# Patient Record
Sex: FEMALE | Race: WHITE | NOT HISPANIC OR LATINO
[De-identification: names, ages, dates, MRNs, and addresses within clinical notes are randomized per-mention and may not be internally consistent; named-entity substitution may affect disease eponyms.]

---

## 2017-09-06 ENCOUNTER — FORM ENCOUNTER (OUTPATIENT)
Age: 57
End: 2017-09-06

## 2018-09-05 ENCOUNTER — FORM ENCOUNTER (OUTPATIENT)
Age: 58
End: 2018-09-05

## 2019-09-04 ENCOUNTER — FORM ENCOUNTER (OUTPATIENT)
Age: 59
End: 2019-09-04

## 2019-09-18 ENCOUNTER — FORM ENCOUNTER (OUTPATIENT)
Age: 59
End: 2019-09-18

## 2020-09-08 ENCOUNTER — FORM ENCOUNTER (OUTPATIENT)
Age: 60
End: 2020-09-08

## 2020-09-13 ENCOUNTER — FORM ENCOUNTER (OUTPATIENT)
Age: 60
End: 2020-09-13

## 2020-09-21 ENCOUNTER — FORM ENCOUNTER (OUTPATIENT)
Age: 60
End: 2020-09-21

## 2020-09-22 ENCOUNTER — FORM ENCOUNTER (OUTPATIENT)
Age: 60
End: 2020-09-22

## 2020-09-23 ENCOUNTER — FORM ENCOUNTER (OUTPATIENT)
Age: 60
End: 2020-09-23

## 2020-10-21 ENCOUNTER — FORM ENCOUNTER (OUTPATIENT)
Age: 60
End: 2020-10-21

## 2020-11-10 ENCOUNTER — FORM ENCOUNTER (OUTPATIENT)
Age: 60
End: 2020-11-10

## 2020-11-30 ENCOUNTER — FORM ENCOUNTER (OUTPATIENT)
Age: 60
End: 2020-11-30

## 2021-03-02 ENCOUNTER — NON-APPOINTMENT (OUTPATIENT)
Age: 61
End: 2021-03-02

## 2021-03-02 PROBLEM — Z00.00 ENCOUNTER FOR PREVENTIVE HEALTH EXAMINATION: Status: ACTIVE | Noted: 2021-03-02

## 2021-03-05 PROBLEM — Z86.59 HISTORY OF DEPRESSION: Status: RESOLVED | Noted: 2021-03-05 | Resolved: 2021-03-05

## 2021-03-05 PROBLEM — Z86.79 HISTORY OF HYPERTENSION: Status: RESOLVED | Noted: 2021-03-05 | Resolved: 2021-03-05

## 2021-03-11 ENCOUNTER — APPOINTMENT (OUTPATIENT)
Dept: BREAST CENTER | Facility: CLINIC | Age: 61
End: 2021-03-11
Payer: COMMERCIAL

## 2021-03-11 VITALS
HEART RATE: 79 BPM | HEIGHT: 59 IN | WEIGHT: 134 LBS | DIASTOLIC BLOOD PRESSURE: 83 MMHG | SYSTOLIC BLOOD PRESSURE: 129 MMHG | BODY MASS INDEX: 27.01 KG/M2

## 2021-03-11 DIAGNOSIS — Z86.59 PERSONAL HISTORY OF OTHER MENTAL AND BEHAVIORAL DISORDERS: ICD-10-CM

## 2021-03-11 DIAGNOSIS — Z86.79 PERSONAL HISTORY OF OTHER DISEASES OF THE CIRCULATORY SYSTEM: ICD-10-CM

## 2021-03-11 PROCEDURE — 99072 ADDL SUPL MATRL&STAF TM PHE: CPT

## 2021-03-11 PROCEDURE — 99214 OFFICE O/P EST MOD 30 MIN: CPT

## 2021-04-16 ENCOUNTER — APPOINTMENT (OUTPATIENT)
Dept: BREAST CENTER | Facility: CLINIC | Age: 61
End: 2021-04-16

## 2021-09-29 ENCOUNTER — NON-APPOINTMENT (OUTPATIENT)
Age: 61
End: 2021-09-29

## 2021-09-29 ENCOUNTER — APPOINTMENT (OUTPATIENT)
Dept: BREAST CENTER | Facility: CLINIC | Age: 61
End: 2021-09-29
Payer: COMMERCIAL

## 2021-09-29 VITALS
DIASTOLIC BLOOD PRESSURE: 79 MMHG | SYSTOLIC BLOOD PRESSURE: 138 MMHG | BODY MASS INDEX: 25.4 KG/M2 | HEIGHT: 59 IN | HEART RATE: 80 BPM | WEIGHT: 126 LBS

## 2021-09-29 PROCEDURE — 99214 OFFICE O/P EST MOD 30 MIN: CPT

## 2021-09-29 RX ORDER — ASPIRIN 81 MG
81 TABLET, DELAYED RELEASE (ENTERIC COATED) ORAL
Refills: 0 | Status: DISCONTINUED | COMMUNITY
End: 2021-09-29

## 2021-10-13 ENCOUNTER — TRANSCRIPTION ENCOUNTER (OUTPATIENT)
Age: 61
End: 2021-10-13

## 2021-10-27 ENCOUNTER — APPOINTMENT (OUTPATIENT)
Dept: OTOLARYNGOLOGY | Facility: CLINIC | Age: 61
End: 2021-10-27
Payer: COMMERCIAL

## 2021-10-27 VITALS
DIASTOLIC BLOOD PRESSURE: 83 MMHG | SYSTOLIC BLOOD PRESSURE: 123 MMHG | TEMPERATURE: 97.2 F | OXYGEN SATURATION: 98 % | HEIGHT: 59 IN | WEIGHT: 127 LBS | HEART RATE: 100 BPM | BODY MASS INDEX: 25.6 KG/M2

## 2021-10-27 DIAGNOSIS — R42 DIZZINESS AND GIDDINESS: ICD-10-CM

## 2021-10-27 PROCEDURE — 99203 OFFICE O/P NEW LOW 30 MIN: CPT

## 2021-10-27 NOTE — ASSESSMENT
[FreeTextEntry1] : -BPPV.\par -will schedule repositioning maneuvers with vest. tx for tomorrow morning.\par -Antivert and Compazine prescribed.

## 2021-10-27 NOTE — HISTORY OF PRESENT ILLNESS
[de-identified] : Initial visit.\par Reports that 2 days she awoke with intense vertigo.  Improved somewhat.\par Yesterday and today much worse.  Associated with extreme nausea and vomiting.\par She notices when she moves her head.\par No history of chronic ear problems.\par She reports having  a similar problem years ago, that responded to head exercises.\par

## 2021-12-15 ENCOUNTER — OUTPATIENT (OUTPATIENT)
Dept: OUTPATIENT SERVICES | Facility: HOSPITAL | Age: 61
LOS: 1 days | Discharge: HOME | End: 2021-12-15
Payer: COMMERCIAL

## 2021-12-15 PROCEDURE — 90870 ELECTROCONVULSIVE THERAPY: CPT

## 2021-12-17 ENCOUNTER — OUTPATIENT (OUTPATIENT)
Dept: OUTPATIENT SERVICES | Facility: HOSPITAL | Age: 61
LOS: 1 days | Discharge: HOME | End: 2021-12-17
Payer: COMMERCIAL

## 2021-12-17 PROCEDURE — 90870 ELECTROCONVULSIVE THERAPY: CPT

## 2021-12-20 ENCOUNTER — OUTPATIENT (OUTPATIENT)
Dept: OUTPATIENT SERVICES | Facility: HOSPITAL | Age: 61
LOS: 1 days | Discharge: HOME | End: 2021-12-20
Payer: COMMERCIAL

## 2021-12-20 PROCEDURE — 90870 ELECTROCONVULSIVE THERAPY: CPT

## 2021-12-22 ENCOUNTER — OUTPATIENT (OUTPATIENT)
Dept: OUTPATIENT SERVICES | Facility: HOSPITAL | Age: 61
LOS: 1 days | Discharge: HOME | End: 2021-12-22
Payer: COMMERCIAL

## 2021-12-22 PROCEDURE — 90870 ELECTROCONVULSIVE THERAPY: CPT

## 2021-12-29 ENCOUNTER — OUTPATIENT (OUTPATIENT)
Dept: OUTPATIENT SERVICES | Facility: HOSPITAL | Age: 61
LOS: 1 days | Discharge: HOME | End: 2021-12-29
Payer: COMMERCIAL

## 2021-12-29 PROCEDURE — 90870 ELECTROCONVULSIVE THERAPY: CPT

## 2022-01-03 ENCOUNTER — OUTPATIENT (OUTPATIENT)
Dept: OUTPATIENT SERVICES | Facility: HOSPITAL | Age: 62
LOS: 1 days | Discharge: HOME | End: 2022-01-03
Payer: COMMERCIAL

## 2022-01-03 PROCEDURE — 90870 ELECTROCONVULSIVE THERAPY: CPT

## 2022-02-10 ENCOUNTER — APPOINTMENT (OUTPATIENT)
Dept: HEART AND VASCULAR | Facility: CLINIC | Age: 62
End: 2022-02-10
Payer: COMMERCIAL

## 2022-02-10 ENCOUNTER — NON-APPOINTMENT (OUTPATIENT)
Age: 62
End: 2022-02-10

## 2022-02-10 VITALS
HEIGHT: 59 IN | BODY MASS INDEX: 22.78 KG/M2 | HEART RATE: 82 BPM | DIASTOLIC BLOOD PRESSURE: 62 MMHG | WEIGHT: 113 LBS | SYSTOLIC BLOOD PRESSURE: 135 MMHG

## 2022-02-10 PROCEDURE — 99204 OFFICE O/P NEW MOD 45 MIN: CPT

## 2022-02-10 PROCEDURE — 93000 ELECTROCARDIOGRAM COMPLETE: CPT

## 2022-02-10 RX ORDER — METOPROLOL SUCCINATE 50 MG/1
50 TABLET, EXTENDED RELEASE ORAL
Refills: 0 | Status: ACTIVE | COMMUNITY
Start: 2022-02-10

## 2022-02-10 RX ORDER — LOSARTAN POTASSIUM 100 MG/1
TABLET, FILM COATED ORAL
Refills: 0 | Status: DISCONTINUED | COMMUNITY
End: 2022-02-10

## 2022-02-10 RX ORDER — MECLIZINE HYDROCHLORIDE 12.5 MG/1
12.5 TABLET ORAL
Qty: 20 | Refills: 0 | Status: DISCONTINUED | COMMUNITY
Start: 2021-10-27 | End: 2022-02-10

## 2022-02-10 RX ORDER — VILAZODONE HYDROCHLORIDE 40 MG/1
TABLET ORAL
Refills: 0 | Status: DISCONTINUED | COMMUNITY
End: 2022-02-10

## 2022-02-10 RX ORDER — PROCHLORPERAZINE MALEATE 10 MG/1
10 TABLET ORAL
Qty: 12 | Refills: 0 | Status: DISCONTINUED | COMMUNITY
Start: 2021-10-27 | End: 2022-02-10

## 2022-02-10 RX ORDER — ARIPIPRAZOLE 2 MG/1
TABLET ORAL
Refills: 0 | Status: DISCONTINUED | COMMUNITY
End: 2022-02-10

## 2022-02-10 NOTE — PHYSICAL EXAM
[Well Developed] : well developed [Well Nourished] : well nourished [No Acute Distress] : no acute distress [Normal Conjunctiva] : normal conjunctiva [Normal S1, S2] : normal S1, S2 [Clear Lung Fields] : clear lung fields [Good Air Entry] : good air entry [No Respiratory Distress] : no respiratory distress  [Soft] : abdomen soft [Normal Gait] : normal gait [No Edema] : no edema [No Rash] : no rash [Moves all extremities] : moves all extremities [Alert and Oriented] : alert and oriented

## 2022-02-13 NOTE — REVIEW OF SYSTEMS
[Headache] : headache [Feeling Fatigued] : feeling fatigued [Palpitations] : palpitations [Dizziness] : dizziness [Depression] : depression [Anxiety] : anxiety [Negative] : Heme/Lymph [Fever] : no fever [Chills] : no chills [SOB] : no shortness of breath [Dyspnea on exertion] : not dyspnea during exertion [Chest Discomfort] : no chest discomfort [Syncope] : no syncope

## 2022-02-13 NOTE — ADDENDUM
[FreeTextEntry1] : I, Tej Herndon, hereby attest that the medical record entry for this patient accurately reflects signatures/notations that I made on the Date of Service in my capacity as an Attending Physician when I treated/diagnosed the above patient. I do hereby attest that this information is true, accurate and complete to the best of my knowledge and I understand that any falsification, omission, or concealment of material fact may subject me to administrative, civil, or, criminal liability. I agree with the note as written by my PA in its entirety.\par I was present for the entire visit and supervised the entire visit and agree with the plan as outlined.\par \par

## 2022-02-13 NOTE — REASON FOR VISIT
[FreeTextEntry1] : 62 year old female with anxiety and depression s/p ECT depression ECT, HTN and palpitations, who presents for initial evaluation.\par \par SHe states she had ECT after being very depressed since COVID.  Since then she has been experiencing nausea, fatigue, headache and vertigo like symptoms.  She also notes fast heart rates and palpitations.  She states they are regular and rapid and can last hours.  They are associated with increased anxiety.  No prior history of arrhythmia.  She saw Dr. Powell from cardiology and had an echo which she states was normal besides slight MR.  He switched her Losartan to Toprol.  The Metoprolol has helped with the palpitations.  No chest pain, syncope, orthopnea, PND or BOOTHE.  \par \par

## 2022-02-13 NOTE — DISCUSSION/SUMMARY
[FreeTextEntry1] : 62 year old female with anxiety and depression s/p ECT depression ECT, HTN and palpitations, who presents for initial evaluation.  We discussed that palpitations can be from the normal heart rhythm or be from an arrhythmia.  I told her I would like to make sure she isnt having any atrial fibrillation and she is agreeable to wear a 2 week event monitor to assess for this (palpitations occur regularly).  She has her other symptoms today while in NSR so we discussed they are likely not associated with a heart rhythm issues.  We discussed that Metoprolol can increase depression in a subset of patients.  As of yet she has been tolerating this well with some symptom relief.  She will follow up after she returns her monitor or sooner if needed and knows to call with any questions or concerns.

## 2022-03-01 ENCOUNTER — APPOINTMENT (OUTPATIENT)
Dept: HEART AND VASCULAR | Facility: CLINIC | Age: 62
End: 2022-03-01
Payer: COMMERCIAL

## 2022-03-01 PROCEDURE — 93272 ECG/REVIEW INTERPRET ONLY: CPT

## 2022-03-07 ENCOUNTER — APPOINTMENT (OUTPATIENT)
Dept: BREAST CENTER | Facility: CLINIC | Age: 62
End: 2022-03-07

## 2022-04-25 ENCOUNTER — APPOINTMENT (OUTPATIENT)
Dept: BREAST CENTER | Facility: CLINIC | Age: 62
End: 2022-04-25
Payer: COMMERCIAL

## 2022-04-25 VITALS — BODY MASS INDEX: 23.59 KG/M2 | HEIGHT: 59 IN | WEIGHT: 117 LBS

## 2022-04-25 DIAGNOSIS — Z78.9 OTHER SPECIFIED HEALTH STATUS: ICD-10-CM

## 2022-04-25 PROCEDURE — 99213 OFFICE O/P EST LOW 20 MIN: CPT

## 2022-04-25 RX ORDER — VENLAFAXINE HYDROCHLORIDE 225 MG/1
225 TABLET, EXTENDED RELEASE ORAL
Refills: 0 | Status: ACTIVE | COMMUNITY

## 2022-04-25 RX ORDER — VILAZODONE HYDROCHLORIDE 20 MG/1
20 TABLET ORAL
Refills: 0 | Status: DISCONTINUED | COMMUNITY
Start: 2022-02-10 | End: 2022-04-25

## 2022-04-28 ENCOUNTER — APPOINTMENT (OUTPATIENT)
Dept: HEART AND VASCULAR | Facility: CLINIC | Age: 62
End: 2022-04-28

## 2022-09-09 ENCOUNTER — APPOINTMENT (OUTPATIENT)
Dept: BREAST CENTER | Facility: CLINIC | Age: 62
End: 2022-09-09

## 2022-09-09 VITALS
HEART RATE: 73 BPM | BODY MASS INDEX: 26.21 KG/M2 | SYSTOLIC BLOOD PRESSURE: 147 MMHG | HEIGHT: 59 IN | DIASTOLIC BLOOD PRESSURE: 85 MMHG | WEIGHT: 130 LBS

## 2022-09-09 DIAGNOSIS — N64.9 DISORDER OF BREAST, UNSPECIFIED: ICD-10-CM

## 2022-09-09 PROCEDURE — 99213 OFFICE O/P EST LOW 20 MIN: CPT

## 2022-09-09 RX ORDER — ATORVASTATIN CALCIUM 10 MG/1
10 TABLET, FILM COATED ORAL
Refills: 0 | Status: ACTIVE | COMMUNITY

## 2023-03-06 ENCOUNTER — APPOINTMENT (OUTPATIENT)
Dept: BREAST CENTER | Facility: CLINIC | Age: 63
End: 2023-03-06
Payer: COMMERCIAL

## 2023-03-06 VITALS
HEIGHT: 59 IN | DIASTOLIC BLOOD PRESSURE: 82 MMHG | WEIGHT: 146 LBS | HEART RATE: 80 BPM | SYSTOLIC BLOOD PRESSURE: 125 MMHG | BODY MASS INDEX: 29.43 KG/M2

## 2023-03-06 DIAGNOSIS — Z78.9 OTHER SPECIFIED HEALTH STATUS: ICD-10-CM

## 2023-03-06 PROCEDURE — 99213 OFFICE O/P EST LOW 20 MIN: CPT

## 2023-03-06 NOTE — HISTORY OF PRESENT ILLNESS
[FreeTextEntry1] : Patient is a 64yo F who presents for breast cancer surveillance. S/p LEFT needle loc. lumpx and SNB in 10/2020 (age 60) yielding 1.7cm IDC, ER+, HER2-. S/p RT and currently on Anastrozole (Dr. Mcnamara). S/p LEFT excisional bx in 2006 (age 46) for ADH (Dr. Donovan, no path on file). Patient has a family history of breast cancer in mother, dx age 44. Patient is BRCA/9 gene panel negative (tested 2020). Patient denies any palpable masses, skin changes, or nipple discharge. \par \par TNM Staging: pT1c, pN0, pM0\par AJCC Staging: IA\par \par 1/10/12: R FNA: negative for malignant cells\par 1/10/12: L FNA: atypical -sheets of apocrine cells w/ focal atypia; favoring cystic papillary apocrine metaplasia over low-grade apocrine neoplastic changes\par 9/7/17: B/l MG: dense, L - stable superior post-op distortion. BI-RADS 2.\par 9/5/19: B/l MG: stable. BI-RADS 2.\par 9/9/20: B/l MG & US - dense L - questioned lower central persistent architectural distortion; US - L - 1.8cm irregular hypoechoic mass 5:00 5FN c/w distortion on MG; R - 0.4cm cyst 9:00 7FN. BI-RADS 4.\par 9/14/20: L Us bx 5:00 5FN: ribbon shaped clip - 0.8cm well-differentiated IDC ER+ (90%), [place]+ (90%), HER2 1+ (-)\par 9/22/20: MRI -left 1 .6 area enhancement corresponds to known cancer. Additional 3.5 CM linear enhancement-biopsy recommend\par 9/24/20: L MRI bx of anterior NME path- nonprolif breast changes, cylinder clip, concordant. L MRI bx of posterior NME path-  nonprolif breast changes, dumbbell clip, concordant \par 10/22/20: L nloc lumpx & SLNBx path- 1.7 cm well differentiated invasive ductal carcinoma (ER/[place] >90%, HER2-), intermediate DCIS, negative margins, LN 0/1\par 9/8/21: B/L MG & US- Dense. L postsurgical changes. No adenopathy. BIRADS 2.\par 4/25/22: MRI- no MR evidence of malignancy\par 9/9/22: B/l MG & US- heterogenously dense. L stable postsurgical changes RAJEEV. BI-RADS 2\par 3/6/23: B/l MRI- heterogeneously dense, L stable postsurgical and RT changes. RAJEEV. BIRADS 2.

## 2023-03-06 NOTE — PAST MEDICAL HISTORY
[Menarche Age ____] : age at menarche was [unfilled] [Menopause Age____] : age at menopause was [unfilled] [Total Preg ___] : G[unfilled] [Live Births ___] : P[unfilled]  [Age At Live Birth ___] : Age at live birth: [unfilled] [History of Hormone Replacement Treatment] : has no history of hormone replacement treatment [FreeTextEntry5] : No [FreeTextEntry6] : yes  [FreeTextEntry7] : never

## 2023-03-06 NOTE — PHYSICAL EXAM
[Normocephalic] : normocephalic [EOMI] : extra ocular movement intact [Supple] : supple [No Supraclavicular Adenopathy] : no supraclavicular adenopathy [No Cervical Adenopathy] : no cervical adenopathy [de-identified] : \par  [de-identified] : \par Right breast/axilla/supraclavicular area: No masses, discharge, or adenopathy\par \par  [de-identified] : Left breast/axilla/supraclavicular area-no evidence of recurrence

## 2023-07-10 ENCOUNTER — NON-APPOINTMENT (OUTPATIENT)
Age: 63
End: 2023-07-10

## 2023-08-17 ENCOUNTER — APPOINTMENT (OUTPATIENT)
Dept: BREAST CENTER | Facility: CLINIC | Age: 63
End: 2023-08-17
Payer: COMMERCIAL

## 2023-08-17 VITALS
BODY MASS INDEX: 28.43 KG/M2 | HEART RATE: 65 BPM | SYSTOLIC BLOOD PRESSURE: 150 MMHG | HEIGHT: 59 IN | WEIGHT: 141 LBS | DIASTOLIC BLOOD PRESSURE: 84 MMHG

## 2023-08-17 VITALS
HEART RATE: 65 BPM | SYSTOLIC BLOOD PRESSURE: 150 MMHG | DIASTOLIC BLOOD PRESSURE: 84 MMHG | WEIGHT: 141 LBS | HEIGHT: 59 IN | BODY MASS INDEX: 28.43 KG/M2

## 2023-08-17 DIAGNOSIS — Z78.9 OTHER SPECIFIED HEALTH STATUS: ICD-10-CM

## 2023-08-17 DIAGNOSIS — Z85.3 PERSONAL HISTORY OF MALIGNANT NEOPLASM OF BREAST: ICD-10-CM

## 2023-08-17 DIAGNOSIS — Z80.3 FAMILY HISTORY OF MALIGNANT NEOPLASM OF BREAST: ICD-10-CM

## 2023-08-17 PROCEDURE — 99213 OFFICE O/P EST LOW 20 MIN: CPT

## 2023-08-17 NOTE — HISTORY OF PRESENT ILLNESS
[FreeTextEntry1] : Patient is a 64yo F who presents for breast cancer surveillance. Hx of LEFT needle loc. lumpx and SNB in 10/2020 (age 60) yielding 1.7cm IDC (ER+, HER2-), DCIS, negative margins, 0/1LN. S/p RT. Currently on Anastrozole (Dr. Mcnamara). Hx of LEFT excisional bx in 2006 (age 46) for ADH (Dr. Donovan, no path on file). Patient has a family history of breast cancer in mother (age 44). Patient is BRCA/9 gene panel negative (tested 2020). Patient denies any palpable masses, skin changes, or nipple discharge.   TNM Staging: pT1c, pN0, pM0 AJCC Staging: IA  9/7/17: B/l MG- dense, L - stable superior post-op distortion. BI-RADS 2. 9/5/19: B/l MG- stable. BI-RADS 2. 9/9/20: B/l MG & US- dense L - questioned lower central persistent architectural distortion; US - L - 1.8cm irregular hypoechoic mass 5:00 5FN c/w distortion on MG; R - 0.4cm cyst 9:00 7FN. BI-RADS 4. 9/14/20: L US bx 5:00 5FN: ribbon shaped clip - 0.8cm well-differentiated IDC ER+ (90%), [place]+ (90%), HER2 1+ (-) 9/22/20: MRI- L 1 .6 area enhancement corresponds to known cancer. Additional 3.5 CM linear enhancement-biopsy recommend 9/24/20: L MR bx x2- SITE 1) L anterior NME- nonprolif breast changes, cylinder clip, concordant. SITE 2) L posterior NME-  nonprolif breast changes, dumbbell clip, concordant  10/22/20: L nloc lumpx & SLNBx path- 1.7 cm well differentiated invasive ductal carcinoma (ER/[place] >90%, HER2-), intermediate DCIS, negative margins, LN 0/1 9/8/21: B/L MG & US- Dense. L postsurgical changes. No adenopathy. BIRADS 2. 4/25/22: MRI- no MR evidence of malignancy 9/9/22: B/l MG & US- heterogenously dense. L stable postsurgical changes RAJEEV. BI-RADS 2 3/6/23: B/l MRI- heterogeneously dense, L stable postsurgical and RT changes. RAJEEV. BIRADS 2.  8/17/23: B/l MG & US- heterogenously dense. L stable surgical changes. R 0.2cm cyst 9:00 4FN. R stable cyst vs mass 9:00. BI-RADS 2

## 2023-08-17 NOTE — PHYSICAL EXAM
[Normocephalic] : normocephalic [EOMI] : extra ocular movement intact [Supple] : supple [No Supraclavicular Adenopathy] : no supraclavicular adenopathy [No Cervical Adenopathy] : no cervical adenopathy [de-identified] : \par   [de-identified] : \par  Right breast/axilla/supraclavicular area: No masses, discharge, or adenopathy\par  \par   [de-identified] : Left breast/axilla/supraclavicular area-no evidence of recurrence

## 2023-08-25 ENCOUNTER — APPOINTMENT (OUTPATIENT)
Dept: BREAST CENTER | Facility: CLINIC | Age: 63
End: 2023-08-25

## 2023-09-07 ENCOUNTER — APPOINTMENT (OUTPATIENT)
Dept: BREAST CENTER | Facility: CLINIC | Age: 63
End: 2023-09-07

## 2023-11-02 ENCOUNTER — APPOINTMENT (OUTPATIENT)
Dept: HEMATOLOGY ONCOLOGY | Facility: CLINIC | Age: 63
End: 2023-11-02
Payer: COMMERCIAL

## 2023-11-02 VITALS
HEIGHT: 59 IN | OXYGEN SATURATION: 96 % | DIASTOLIC BLOOD PRESSURE: 78 MMHG | RESPIRATION RATE: 18 BRPM | HEART RATE: 79 BPM | BODY MASS INDEX: 27.42 KG/M2 | WEIGHT: 136 LBS | SYSTOLIC BLOOD PRESSURE: 135 MMHG | TEMPERATURE: 98.7 F

## 2023-11-02 DIAGNOSIS — M85.852 OTHER SPECIFIED DISORDERS OF BONE DENSITY AND STRUCTURE, LEFT THIGH: ICD-10-CM

## 2023-11-02 PROCEDURE — 99205 OFFICE O/P NEW HI 60 MIN: CPT

## 2023-11-02 RX ORDER — MIRTAZAPINE 7.5 MG/1
7.5 TABLET, FILM COATED ORAL
Refills: 0 | Status: DISCONTINUED | COMMUNITY
End: 2023-11-02

## 2023-11-02 RX ORDER — CALCIUM CITRATE/VITAMIN D3 315MG-6.25
TABLET ORAL
Refills: 0 | Status: ACTIVE | COMMUNITY

## 2023-11-02 RX ORDER — ALPRAZOLAM 0.5 MG/1
0.5 TABLET, EXTENDED RELEASE ORAL
Refills: 0 | Status: DISCONTINUED | COMMUNITY
End: 2023-11-02

## 2023-11-02 RX ORDER — CHLORHEXIDINE GLUCONATE 4 %
LIQUID (ML) TOPICAL
Refills: 0 | Status: ACTIVE | COMMUNITY

## 2024-03-07 ENCOUNTER — APPOINTMENT (OUTPATIENT)
Dept: BREAST CENTER | Facility: CLINIC | Age: 64
End: 2024-03-07
Payer: COMMERCIAL

## 2024-03-07 VITALS
SYSTOLIC BLOOD PRESSURE: 126 MMHG | HEIGHT: 59 IN | BODY MASS INDEX: 26.41 KG/M2 | HEART RATE: 76 BPM | DIASTOLIC BLOOD PRESSURE: 86 MMHG | WEIGHT: 131 LBS

## 2024-03-07 DIAGNOSIS — R92.8 OTHER ABNORMAL AND INCONCLUSIVE FINDINGS ON DIAGNOSTIC IMAGING OF BREAST: ICD-10-CM

## 2024-03-07 PROCEDURE — 99215 OFFICE O/P EST HI 40 MIN: CPT

## 2024-03-07 NOTE — PHYSICAL EXAM
[Normocephalic] : normocephalic [EOMI] : extra ocular movement intact [Supple] : supple [No Supraclavicular Adenopathy] : no supraclavicular adenopathy [No Cervical Adenopathy] : no cervical adenopathy [de-identified] : \par   [de-identified] : \par  Right breast/axilla/supraclavicular area: No masses, discharge, or adenopathy\par  \par   [de-identified] : Left breast/axilla/supraclavicular area- 2 cm palpable nodularity superior to lumpx site 4:00

## 2024-03-18 ENCOUNTER — RESULT REVIEW (OUTPATIENT)
Age: 64
End: 2024-03-18

## 2024-05-01 PROBLEM — Z79.811 AROMATASE INHIBITOR USE: Status: ACTIVE | Noted: 2024-05-01

## 2024-05-01 PROBLEM — Z13.820 OSTEOPOROSIS SCREENING: Status: ACTIVE | Noted: 2024-05-01

## 2024-05-02 ENCOUNTER — APPOINTMENT (OUTPATIENT)
Dept: HEMATOLOGY ONCOLOGY | Facility: CLINIC | Age: 64
End: 2024-05-02
Payer: COMMERCIAL

## 2024-05-02 VITALS
TEMPERATURE: 98.6 F | HEIGHT: 59 IN | HEART RATE: 73 BPM | WEIGHT: 132 LBS | OXYGEN SATURATION: 96 % | BODY MASS INDEX: 26.61 KG/M2 | RESPIRATION RATE: 18 BRPM | DIASTOLIC BLOOD PRESSURE: 82 MMHG | SYSTOLIC BLOOD PRESSURE: 134 MMHG

## 2024-05-02 DIAGNOSIS — Z13.820 ENCOUNTER FOR SCREENING FOR OSTEOPOROSIS: ICD-10-CM

## 2024-05-02 DIAGNOSIS — C50.912 MALIGNANT NEOPLASM OF UNSPECIFIED SITE OF LEFT FEMALE BREAST: ICD-10-CM

## 2024-05-02 DIAGNOSIS — Z79.811 LONG TERM (CURRENT) USE OF AROMATASE INHIBITORS: ICD-10-CM

## 2024-05-02 PROCEDURE — 99213 OFFICE O/P EST LOW 20 MIN: CPT

## 2024-05-02 PROCEDURE — G2211 COMPLEX E/M VISIT ADD ON: CPT

## 2024-05-02 RX ORDER — ANASTROZOLE TABLETS 1 MG/1
TABLET ORAL
Refills: 0 | Status: DISCONTINUED | COMMUNITY
End: 2024-05-02

## 2024-05-02 RX ORDER — ALPRAZOLAM 0.5 MG/1
0.5 TABLET, ORALLY DISINTEGRATING ORAL TWICE DAILY
Refills: 0 | Status: DISCONTINUED | COMMUNITY
End: 2024-05-02

## 2024-05-02 RX ORDER — VENLAFAXINE HYDROCHLORIDE 37.5 MG/1
37.5 CAPSULE, EXTENDED RELEASE ORAL
Refills: 0 | Status: DISCONTINUED | COMMUNITY
End: 2024-05-02

## 2024-05-02 RX ORDER — LURASIDONE HYDROCHLORIDE 20 MG/1
20 TABLET, FILM COATED ORAL
Refills: 0 | Status: ACTIVE | COMMUNITY

## 2024-05-02 RX ORDER — ANASTROZOLE TABLETS 1 MG/1
1 TABLET ORAL DAILY
Qty: 90 | Refills: 1 | Status: ACTIVE | COMMUNITY
Start: 2023-11-02 | End: 1900-01-01

## 2024-05-02 NOTE — BEGINNING OF VISIT
[3] : 2) Feeling down, depressed, or hopeless for nearly every day (3) [PHQ-2 Positive] : PHQ-2 Positive [PHQ-9 Deferred] : PHQ-9 Deferred [Detailed Documented Plan in Note] : Detailed Documented Plan in Note [[id number]] : 6 [Date Discussed (MM/DD/YY): ___] :  Discussed: [unfilled] [With Patient/Caregiver] : with Patient/Caregiver

## 2024-05-02 NOTE — BEGINNING OF VISIT
[3] : 2) Feeling down, depressed, or hopeless for nearly every day (3) [PHQ-2 Positive] : PHQ-2 Positive [PHQ-9 Deferred] : PHQ-9 Deferred [Detailed Documented Plan in Note] : Detailed Documented Plan in Note [[id number]] : 6 [Date Discussed (MM/DD/YY): ___] :  Discussed: [unfilled] [With Patient/Caregiver] : with Patient/Caregiver

## 2024-05-02 NOTE — HISTORY OF PRESENT ILLNESS
[Disease: _____________________] : Disease: [unfilled] [T: ___] : T[unfilled] [N: ___] : N[unfilled] [M: ___] : M[unfilled] [AJCC Stage: ____] : AJCC Stage: [unfilled] [de-identified] : 64 year old female with a history of left breast cancer s/p 10/22/20 left lumpectomy/SLNB, adjuvant RT 12/21/20-1/20/21, and adjuvant anastrozole since 12/14/20.  2006 - LEFT excisional bx (age 46) for ADH (Dr. Donovan, no path on file). 9/7/17: B/l MG- dense, L - stable superior post-op distortion. BI-RADS 2. 9/5/19: B/l MG- stable. BI-RADS 2. 9/9/20: B/l MG & US- dense L - questioned lower central persistent architectural distortion; US - L - 1.8cm irregular hypoechoic mass 5:00 5FN c/w distortion on MG; R - 0.4cm cyst 9:00 7FN. BI-RADS 4. 9/14/20: L US bx 5:00 5FN: ribbon shaped clip - 0.8cm well-differentiated IDC ER+ (90%), [place]+ (90%), HER2 1+ (-) 9/22/20: MRI- L 1 .6 area enhancement corresponds to known cancer. Additional 3.5 CM linear enhancement. 9/24/20: L MR bx x2- SITE 1) L anterior NME- nonprolif breast changes, cylinder clip, concordant. SITE 2) L posterior NME- nonprolif breast changes, dumbbell clip, concordant 10/22/20: L nloc lumpx & SLNBx path- 1.7 cm well differentiated invasive ductal carcinoma (ER/[place] >90%, HER2-), intermediate DCIS, negative margins, LN 0/1 4/6/21 CT chest: 4 mm calcified granuloma in the lateral left upper lobe. 9/8/21: B/L MG & US- Dense. L postsurgical changes. No adenopathy. BIRADS 2.  4/25/22: MRI- no MR evidence of malignancy 9/9/22: B/l MG & US- heterogeneously dense. L stable postsurgical changes RAJEEV. BI-RADS 2 3/6/23: B/l MRI- heterogeneously dense, L stable postsurgical and RT changes. RAJEEV. BIRADS 2. 3/8/23 US abdomen - unremarkable. 6/20/23 DEXA: osteopenia of left femoral neck (T score -1.3) 8/17/23: B/l MG & US- heterogeneously dense. L stable surgical changes. R 0.2cm cyst 9:00 4FN. R stable cyst vs mass 9:00. BI-RADS 2 3/7/24: MRI- heterogeneously dense, L new area of irregular enhancement w/ likely associated 2.8 cm suspicious distortion in area of prior lumpx (does NOT extend to skin or chest wall) (rec L MR bx), R-RAJEEV. B/l nipples and skin unremarkable, b/l no significant axillary or internal mammary lymphadenopathy. BIRADS 4. 3/18/24 L breast lower outer MR biopsy: Breast tissue with changes of prior procedure, including fibrosis, fat necrosis and inflammation, associates with calcifications [de-identified] : well differentiated invasive ductal carcinoma [de-identified] : ER+, [place]+, HER2- (1+), Oncotype RS 9 [de-identified] : BRCA/9 gene panel negative (tested 2020) [FreeTextEntry1] : ADJUVANT anastrozole 12/14/20-present [de-identified] : She has been taking anastrozole since 12/2020, tolerating well.  She had MR breast on 3/7/24 which showed a new area of irregular enhancement with likely associated suspicious distortion in area of prior lumpectomy.  Biopsy was benign.  She is undergoing psychotherapy for anxiety and depression. Occasional twinges in the left axilla to pressure.  Pre-existing menopausal hot flashes. She had a superficial midabdominal abscess, treated with doxycycline.

## 2024-05-02 NOTE — HISTORY OF PRESENT ILLNESS
[Disease: _____________________] : Disease: [unfilled] [T: ___] : T[unfilled] [N: ___] : N[unfilled] [M: ___] : M[unfilled] [AJCC Stage: ____] : AJCC Stage: [unfilled] [de-identified] : 64 year old female with a history of left breast cancer s/p 10/22/20 left lumpectomy/SLNB, adjuvant RT 12/21/20-1/20/21, and adjuvant anastrozole since 12/14/20.  2006 - LEFT excisional bx (age 46) for ADH (Dr. Donovan, no path on file). 9/7/17: B/l MG- dense, L - stable superior post-op distortion. BI-RADS 2. 9/5/19: B/l MG- stable. BI-RADS 2. 9/9/20: B/l MG & US- dense L - questioned lower central persistent architectural distortion; US - L - 1.8cm irregular hypoechoic mass 5:00 5FN c/w distortion on MG; R - 0.4cm cyst 9:00 7FN. BI-RADS 4. 9/14/20: L US bx 5:00 5FN: ribbon shaped clip - 0.8cm well-differentiated IDC ER+ (90%), [place]+ (90%), HER2 1+ (-) 9/22/20: MRI- L 1 .6 area enhancement corresponds to known cancer. Additional 3.5 CM linear enhancement. 9/24/20: L MR bx x2- SITE 1) L anterior NME- nonprolif breast changes, cylinder clip, concordant. SITE 2) L posterior NME- nonprolif breast changes, dumbbell clip, concordant 10/22/20: L nloc lumpx & SLNBx path- 1.7 cm well differentiated invasive ductal carcinoma (ER/[place] >90%, HER2-), intermediate DCIS, negative margins, LN 0/1 4/6/21 CT chest: 4 mm calcified granuloma in the lateral left upper lobe. 9/8/21: B/L MG & US- Dense. L postsurgical changes. No adenopathy. BIRADS 2.  4/25/22: MRI- no MR evidence of malignancy 9/9/22: B/l MG & US- heterogeneously dense. L stable postsurgical changes RAJEEV. BI-RADS 2 3/6/23: B/l MRI- heterogeneously dense, L stable postsurgical and RT changes. RAJEEV. BIRADS 2. 3/8/23 US abdomen - unremarkable. 6/20/23 DEXA: osteopenia of left femoral neck (T score -1.3) 8/17/23: B/l MG & US- heterogeneously dense. L stable surgical changes. R 0.2cm cyst 9:00 4FN. R stable cyst vs mass 9:00. BI-RADS 2 3/7/24: MRI- heterogeneously dense, L new area of irregular enhancement w/ likely associated 2.8 cm suspicious distortion in area of prior lumpx (does NOT extend to skin or chest wall) (rec L MR bx), R-RAJEEV. B/l nipples and skin unremarkable, b/l no significant axillary or internal mammary lymphadenopathy. BIRADS 4. 3/18/24 L breast lower outer MR biopsy: Breast tissue with changes of prior procedure, including fibrosis, fat necrosis and inflammation, associates with calcifications [de-identified] : ER+, [place]+, HER2- (1+), Oncotype RS 9 [de-identified] : well differentiated invasive ductal carcinoma [de-identified] : BRCA/9 gene panel negative (tested 2020) [FreeTextEntry1] : ADJUVANT anastrozole 12/14/20-present [de-identified] : She has been taking anastrozole since 12/2020, tolerating well.  She had MR breast on 3/7/24 which showed a new area of irregular enhancement with likely associated suspicious distortion in area of prior lumpectomy.  Biopsy was benign.  She is undergoing psychotherapy for anxiety and depression. Occasional twinges in the left axilla to pressure.  Pre-existing menopausal hot flashes. She had a superficial midabdominal abscess, treated with doxycycline.

## 2024-08-23 ENCOUNTER — NON-APPOINTMENT (OUTPATIENT)
Age: 64
End: 2024-08-23

## 2024-08-23 NOTE — PAST MEDICAL HISTORY
[Menarche Age ____] : age at menarche was [unfilled] [Menopause Age____] : age at menopause was [unfilled] [History of Hormone Replacement Treatment] : has no history of hormone replacement treatment [Total Preg ___] : G[unfilled] [Live Births ___] : P[unfilled]  [Age At Live Birth ___] : Age at live birth: [unfilled] [FreeTextEntry5] : No [FreeTextEntry6] : yes  [FreeTextEntry7] : never

## 2024-08-23 NOTE — PHYSICAL EXAM
[Normocephalic] : normocephalic [EOMI] : extra ocular movement intact [Supple] : supple [No Supraclavicular Adenopathy] : no supraclavicular adenopathy [No Cervical Adenopathy] : no cervical adenopathy [de-identified] : \par   [de-identified] : \par  Right breast/axilla/supraclavicular area: No masses, discharge, or adenopathy\par  \par   [de-identified] : Left breast/axilla/supraclavicular area- 2 cm palpable nodularity superior to lumpx site 4:00

## 2024-08-23 NOTE — HISTORY OF PRESENT ILLNESS
[FreeTextEntry1] : Patient is a 65 yo F who presents for breast cancer surveillance and abnormal imaging. Hx of LEFT needle loc. lumpx and SNB in 10/2020 (age 60) yielding 1.7cm IDC (ER+, HER2-), DCIS, negative margins, 0/1LN. S/p RT. Currently on Anastrozole (Former Dr. Mcnamara, now Dr. Roberts). Hx of LEFT excisional bx in 2006 (age 46) for ADH (Dr. Donovan, no path on file). Patient has a family history of breast cancer in mother (age 44). Patient is BRCA/9 gene panel negative (tested 2020). Patient denies any palpable masses, skin changes, or nipple discharge. Of note, patient with recent brother in law death in family.  TNM Staging: pT1c, pN0, pM0 AJCC Staging: IA  9/7/17: B/l MG- dense, L - stable superior post-op distortion. BI-RADS 2. 9/5/19: B/l MG- stable. BI-RADS 2. 9/9/20: B/l MG & US- dense L - questioned lower central persistent architectural distortion; US - L - 1.8cm irregular hypoechoic mass 5:00 5FN c/w distortion on MG; R - 0.4cm cyst 9:00 7FN. BI-RADS 4. 9/14/20: L US bx 5:00 5FN: ribbon shaped clip - 0.8cm well-differentiated IDC ER+ (90%), [place]+ (90%), HER2 1+ (-) 9/22/20: MRI- L 1 .6 area enhancement corresponds to known cancer. Additional 3.5 CM linear enhancement-biopsy recommend 9/24/20: L MR bx x2- SITE 1) L anterior NME- nonprolif breast changes, cylinder clip, concordant. SITE 2) L posterior NME-  nonprolif breast changes, dumbbell clip, concordant  10/22/20: L nloc lumpx & SLNBx path- 1.7 cm well differentiated invasive ductal carcinoma (ER/[place] >90%, HER2-), intermediate DCIS, negative margins, LN 0/1 9/8/21: B/L MG & US- Dense. L postsurgical changes. No adenopathy. BIRADS 2. 4/25/22: MRI- no MR evidence of malignancy 9/9/22: B/l MG & US- heterogeneously dense. L stable postsurgical changes RAJEEV. BI-RADS 2 3/6/23: B/l MRI- heterogeneously dense, L stable postsurgical and RT changes. RAJEEV. BIRADS 2.  8/17/23: B/l MG & US- heterogeneously dense. L stable surgical changes. R 0.2cm cyst 9:00 4FN. R stable cyst vs mass 9:00. BI-RADS 2 3/7/24: MRI- heterogeneously dense, L new area of irregular enhancement w/ likely associated 2.8 cm suspicious distortion in area of prior lumpx (does NOT extend to skin or chest wall) (rec L MR bx), R-RAJEEV. B/l nipples and skin unremarkable, b/l no significant axillary or internal mammary lymphadenopathy. BIRADS 4.  3/18/24: L MR bx of L lower outer focus of enhancement (cork clip) yielded breast tissue with changes of prior procedure, including fibrosis, fat necrosis and inflammation, associates w/ calcs. benign and concordant. Rec 6m f/u MRI 9/4/24: B/l MG/US: scheduled 9/4/24: MRI: to be added

## 2024-09-04 ENCOUNTER — NON-APPOINTMENT (OUTPATIENT)
Age: 64
End: 2024-09-04

## 2024-09-04 ENCOUNTER — APPOINTMENT (OUTPATIENT)
Dept: BREAST CENTER | Facility: CLINIC | Age: 64
End: 2024-09-04
Payer: COMMERCIAL

## 2024-09-04 VITALS
DIASTOLIC BLOOD PRESSURE: 86 MMHG | SYSTOLIC BLOOD PRESSURE: 145 MMHG | BODY MASS INDEX: 26.61 KG/M2 | WEIGHT: 132 LBS | HEART RATE: 82 BPM | HEIGHT: 59 IN

## 2024-09-04 DIAGNOSIS — N64.9 DISORDER OF BREAST, UNSPECIFIED: ICD-10-CM

## 2024-09-04 DIAGNOSIS — Z85.3 PERSONAL HISTORY OF MALIGNANT NEOPLASM OF BREAST: ICD-10-CM

## 2024-09-04 DIAGNOSIS — C50.912 MALIGNANT NEOPLASM OF UNSPECIFIED SITE OF LEFT FEMALE BREAST: ICD-10-CM

## 2024-09-04 DIAGNOSIS — R92.8 OTHER ABNORMAL AND INCONCLUSIVE FINDINGS ON DIAGNOSTIC IMAGING OF BREAST: ICD-10-CM

## 2024-09-04 PROCEDURE — 99213 OFFICE O/P EST LOW 20 MIN: CPT

## 2024-09-06 NOTE — PAST MEDICAL HISTORY
[History of Hormone Replacement Treatment] : has no history of hormone replacement treatment [FreeTextEntry5] : No [FreeTextEntry6] : yes  [FreeTextEntry7] : never

## 2024-09-06 NOTE — PHYSICAL EXAM
[de-identified] : \par   [de-identified] : \par  Right breast/axilla/supraclavicular area: No masses, discharge, or adenopathy\par  \par   [de-identified] : Left breast/axilla/supraclavicular area- 2 cm palpable nodularity superior to lumpx site 4:00

## 2024-09-06 NOTE — HISTORY OF PRESENT ILLNESS
[FreeTextEntry1] : Patient is a 63 yo F who presents for breast cancer surveillance and abnormal imaging. Hx of LEFT needle loc. lumpx and SNB in 10/2020 (age 60) yielding 1.7cm IDC (ER+, HER2-), DCIS, negative margins, 0/1LN. S/p RT. Currently on Anastrozole (Former Dr. Mcnamara, now Dr. Roberts). Hx of LEFT excisional bx in 2006 (age 46) for ADH (Dr. Donovan, no path on file). Patient has a family history of breast cancer in mother (age 44). Patient is BRCA/9 gene panel negative (tested 2020). Patient denies any palpable masses, skin changes, or nipple discharge. Of note, patient with recent brother in law death in family.  TNM Staging: pT1c, pN0, pM0 AJCC Staging: IA  9/7/17: B/l MG- dense, L - stable superior post-op distortion. BI-RADS 2. 9/5/19: B/l MG- stable. BI-RADS 2. 9/9/20: B/l MG & US- dense L - questioned lower central persistent architectural distortion; US - L - 1.8cm irregular hypoechoic mass 5:00 5FN c/w distortion on MG; R - 0.4cm cyst 9:00 7FN. BI-RADS 4. 9/14/20: L US bx 5:00 5FN: ribbon shaped clip - 0.8cm well-differentiated IDC ER+ (90%), [place]+ (90%), HER2 1+ (-) 9/22/20: MRI- L 1 .6 area enhancement corresponds to known cancer. Additional 3.5 CM linear enhancement-biopsy recommend 9/24/20: L MR bx x2- SITE 1) L anterior NME- nonprolif breast changes, cylinder clip, concordant. SITE 2) L posterior NME-  nonprolif breast changes, dumbbell clip, concordant  10/22/20: L nloc lumpx & SLNBx path- 1.7 cm well differentiated invasive ductal carcinoma (ER/[place] >90%, HER2-), intermediate DCIS, negative margins, LN 0/1 9/8/21: B/L MG & US- Dense. L postsurgical changes. No adenopathy. BIRADS 2. 4/25/22: MRI- no MR evidence of malignancy 9/9/22: B/l MG & US- heterogeneously dense. L stable postsurgical changes RAJEEV. BI-RADS 2 3/6/23: B/l MRI- heterogeneously dense, L stable postsurgical and RT changes. RAJEEV. BIRADS 2.  8/17/23: B/l MG & US- heterogeneously dense. L stable surgical changes. R 0.2cm cyst 9:00 4FN. R stable cyst vs mass 9:00. BI-RADS 2 3/7/24: MRI- heterogeneously dense, L new area of irregular enhancement w/ likely associated 2.8 cm suspicious distortion in area of prior lumpx (does NOT extend to skin or chest wall) (rec L MR bx), R-RAJEEV. B/l nipples and skin unremarkable, b/l no significant axillary or internal mammary lymphadenopathy. BIRADS 4.  3/18/24: L MR bx of L lower outer focus of enhancement (cork clip) yielded breast tissue with changes of prior procedure, including fibrosis, fat necrosis and inflammation, associates w/ calcs. benign and concordant. Rec 6m f/u MRI 9/4/24: B/l MG/US: MG-heterogeneously dense.  L lower outer 4:00 postsurgical changes with calcifications in lumpectomy bed (rec 6-month L DX MG).  US-R stable 0.3 cm cyst and 0.4 cm mass 9:00 4 FN.  L 5:00 scarring to skin surface.  No adenopathy present.  BI-RADS 3. 9/4/24: MRI: Heterogeneously dense.  R RAJEEV. L stable 1.8 cm residual NME at lower outer site of prior lumpectomy recent MRI biopsy.  L 10:00 2 FN focus of skin enhancement similar to prior imaging.  BI-RADS 2.

## 2024-09-06 NOTE — HISTORY OF PRESENT ILLNESS
[FreeTextEntry1] : Patient is a 65 yo F who presents for breast cancer surveillance and abnormal imaging. Hx of LEFT needle loc. lumpx and SNB in 10/2020 (age 60) yielding 1.7cm IDC (ER+, HER2-), DCIS, negative margins, 0/1LN. S/p RT. Currently on Anastrozole (Former Dr. Mcnamara, now Dr. Roberts). Hx of LEFT excisional bx in 2006 (age 46) for ADH (Dr. Donovan, no path on file). Patient has a family history of breast cancer in mother (age 44). Patient is BRCA/9 gene panel negative (tested 2020). Patient denies any palpable masses, skin changes, or nipple discharge. Of note, patient with recent brother in law death in family.  TNM Staging: pT1c, pN0, pM0 AJCC Staging: IA  9/7/17: B/l MG- dense, L - stable superior post-op distortion. BI-RADS 2. 9/5/19: B/l MG- stable. BI-RADS 2. 9/9/20: B/l MG & US- dense L - questioned lower central persistent architectural distortion; US - L - 1.8cm irregular hypoechoic mass 5:00 5FN c/w distortion on MG; R - 0.4cm cyst 9:00 7FN. BI-RADS 4. 9/14/20: L US bx 5:00 5FN: ribbon shaped clip - 0.8cm well-differentiated IDC ER+ (90%), OH+ (90%), HER2 1+ (-) 9/22/20: MRI- L 1 .6 area enhancement corresponds to known cancer. Additional 3.5 CM linear enhancement-biopsy recommend 9/24/20: L MR bx x2- SITE 1) L anterior NME- nonprolif breast changes, cylinder clip, concordant. SITE 2) L posterior NME-  nonprolif breast changes, dumbbell clip, concordant  10/22/20: L nloc lumpx & SLNBx path- 1.7 cm well differentiated invasive ductal carcinoma (ER/OH >90%, HER2-), intermediate DCIS, negative margins, LN 0/1 9/8/21: B/L MG & US- Dense. L postsurgical changes. No adenopathy. BIRADS 2. 4/25/22: MRI- no MR evidence of malignancy 9/9/22: B/l MG & US- heterogeneously dense. L stable postsurgical changes RAJEEV. BI-RADS 2 3/6/23: B/l MRI- heterogeneously dense, L stable postsurgical and RT changes. RAJEEV. BIRADS 2.  8/17/23: B/l MG & US- heterogeneously dense. L stable surgical changes. R 0.2cm cyst 9:00 4FN. R stable cyst vs mass 9:00. BI-RADS 2 3/7/24: MRI- heterogeneously dense, L new area of irregular enhancement w/ likely associated 2.8 cm suspicious distortion in area of prior lumpx (does NOT extend to skin or chest wall) (rec L MR bx), R-RAJEEV. B/l nipples and skin unremarkable, b/l no significant axillary or internal mammary lymphadenopathy. BIRADS 4.  3/18/24: L MR bx of L lower outer focus of enhancement (cork clip) yielded breast tissue with changes of prior procedure, including fibrosis, fat necrosis and inflammation, associates w/ calcs. benign and concordant. Rec 6m f/u MRI 9/4/24: B/l MG/US: MG-heterogeneously dense.  L lower outer 4:00 postsurgical changes with calcifications in lumpectomy bed (rec 6-month L DX MG).  US-R stable 0.3 cm cyst and 0.4 cm mass 9:00 4 FN.  L 5:00 scarring to skin surface.  No adenopathy present.  BI-RADS 3. 9/4/24: MRI: Heterogeneously dense.  R RAJEEV. L stable 1.8 cm residual NME at lower outer site of prior lumpectomy recent MRI biopsy.  L 10:00 2 FN focus of skin enhancement similar to prior imaging.  BI-RADS 2.

## 2024-09-06 NOTE — HISTORY OF PRESENT ILLNESS
[FreeTextEntry1] : Patient is a 65 yo F who presents for breast cancer surveillance and abnormal imaging. Hx of LEFT needle loc. lumpx and SNB in 10/2020 (age 60) yielding 1.7cm IDC (ER+, HER2-), DCIS, negative margins, 0/1LN. S/p RT. Currently on Anastrozole (Former Dr. Mcnamara, now Dr. Roberts). Hx of LEFT excisional bx in 2006 (age 46) for ADH (Dr. Donovan, no path on file). Patient has a family history of breast cancer in mother (age 44). Patient is BRCA/9 gene panel negative (tested 2020). Patient denies any palpable masses, skin changes, or nipple discharge. Of note, patient with recent brother in law death in family.  TNM Staging: pT1c, pN0, pM0 AJCC Staging: IA  9/7/17: B/l MG- dense, L - stable superior post-op distortion. BI-RADS 2. 9/5/19: B/l MG- stable. BI-RADS 2. 9/9/20: B/l MG & US- dense L - questioned lower central persistent architectural distortion; US - L - 1.8cm irregular hypoechoic mass 5:00 5FN c/w distortion on MG; R - 0.4cm cyst 9:00 7FN. BI-RADS 4. 9/14/20: L US bx 5:00 5FN: ribbon shaped clip - 0.8cm well-differentiated IDC ER+ (90%), NE+ (90%), HER2 1+ (-) 9/22/20: MRI- L 1 .6 area enhancement corresponds to known cancer. Additional 3.5 CM linear enhancement-biopsy recommend 9/24/20: L MR bx x2- SITE 1) L anterior NME- nonprolif breast changes, cylinder clip, concordant. SITE 2) L posterior NME-  nonprolif breast changes, dumbbell clip, concordant  10/22/20: L nloc lumpx & SLNBx path- 1.7 cm well differentiated invasive ductal carcinoma (ER/NE >90%, HER2-), intermediate DCIS, negative margins, LN 0/1 9/8/21: B/L MG & US- Dense. L postsurgical changes. No adenopathy. BIRADS 2. 4/25/22: MRI- no MR evidence of malignancy 9/9/22: B/l MG & US- heterogeneously dense. L stable postsurgical changes RAJEEV. BI-RADS 2 3/6/23: B/l MRI- heterogeneously dense, L stable postsurgical and RT changes. RAJEEV. BIRADS 2.  8/17/23: B/l MG & US- heterogeneously dense. L stable surgical changes. R 0.2cm cyst 9:00 4FN. R stable cyst vs mass 9:00. BI-RADS 2 3/7/24: MRI- heterogeneously dense, L new area of irregular enhancement w/ likely associated 2.8 cm suspicious distortion in area of prior lumpx (does NOT extend to skin or chest wall) (rec L MR bx), R-RAJEEV. B/l nipples and skin unremarkable, b/l no significant axillary or internal mammary lymphadenopathy. BIRADS 4.  3/18/24: L MR bx of L lower outer focus of enhancement (cork clip) yielded breast tissue with changes of prior procedure, including fibrosis, fat necrosis and inflammation, associates w/ calcs. benign and concordant. Rec 6m f/u MRI 9/4/24: B/l MG/US: MG-heterogeneously dense.  L lower outer 4:00 postsurgical changes with calcifications in lumpectomy bed (rec 6-month L DX MG).  US-R stable 0.3 cm cyst and 0.4 cm mass 9:00 4 FN.  L 5:00 scarring to skin surface.  No adenopathy present.  BI-RADS 3. 9/4/24: MRI: Heterogeneously dense.  R RAJEEV. L stable 1.8 cm residual NME at lower outer site of prior lumpectomy recent MRI biopsy.  L 10:00 2 FN focus of skin enhancement similar to prior imaging.  BI-RADS 2.

## 2024-09-06 NOTE — PHYSICAL EXAM
[de-identified] : \par   [de-identified] : \par  Right breast/axilla/supraclavicular area: No masses, discharge, or adenopathy\par  \par   [de-identified] : Left breast/axilla/supraclavicular area- 2 cm palpable nodularity superior to lumpx site 4:00

## 2024-09-06 NOTE — PHYSICAL EXAM
[de-identified] : \par   [de-identified] : \par  Right breast/axilla/supraclavicular area: No masses, discharge, or adenopathy\par  \par   [de-identified] : Left breast/axilla/supraclavicular area- 2 cm palpable nodularity superior to lumpx site 4:00 None known

## 2024-12-19 ENCOUNTER — APPOINTMENT (OUTPATIENT)
Dept: HEMATOLOGY ONCOLOGY | Facility: CLINIC | Age: 64
End: 2024-12-19
Payer: COMMERCIAL

## 2024-12-19 VITALS
RESPIRATION RATE: 18 BRPM | BODY MASS INDEX: 27.21 KG/M2 | SYSTOLIC BLOOD PRESSURE: 150 MMHG | TEMPERATURE: 98.9 F | HEIGHT: 59 IN | OXYGEN SATURATION: 95 % | HEART RATE: 94 BPM | WEIGHT: 135 LBS | DIASTOLIC BLOOD PRESSURE: 92 MMHG

## 2024-12-19 DIAGNOSIS — C50.912 MALIGNANT NEOPLASM OF UNSPECIFIED SITE OF LEFT FEMALE BREAST: ICD-10-CM

## 2024-12-19 PROCEDURE — 99213 OFFICE O/P EST LOW 20 MIN: CPT

## 2024-12-19 PROCEDURE — G2211 COMPLEX E/M VISIT ADD ON: CPT

## 2024-12-19 RX ORDER — CARIPRAZINE 1.5 MG/1
1.5 CAPSULE, GELATIN COATED ORAL
Refills: 0 | Status: ACTIVE | COMMUNITY

## 2025-03-04 ENCOUNTER — NON-APPOINTMENT (OUTPATIENT)
Age: 65
End: 2025-03-04

## 2025-06-17 ENCOUNTER — NON-APPOINTMENT (OUTPATIENT)
Age: 65
End: 2025-06-17

## 2025-06-26 ENCOUNTER — APPOINTMENT (OUTPATIENT)
Dept: HEMATOLOGY ONCOLOGY | Facility: CLINIC | Age: 65
End: 2025-06-26

## 2025-06-26 VITALS
OXYGEN SATURATION: 96 % | HEIGHT: 59 IN | HEART RATE: 123 BPM | WEIGHT: 132 LBS | RESPIRATION RATE: 18 BRPM | DIASTOLIC BLOOD PRESSURE: 88 MMHG | SYSTOLIC BLOOD PRESSURE: 126 MMHG | BODY MASS INDEX: 26.61 KG/M2 | TEMPERATURE: 98.6 F

## 2025-06-26 PROBLEM — R22.1 NECK SWELLING: Status: ACTIVE | Noted: 2025-06-26

## 2025-06-26 PROCEDURE — 99214 OFFICE O/P EST MOD 30 MIN: CPT

## 2025-06-26 PROCEDURE — G2211 COMPLEX E/M VISIT ADD ON: CPT | Mod: NC

## 2025-07-08 ENCOUNTER — NON-APPOINTMENT (OUTPATIENT)
Age: 65
End: 2025-07-08

## 2025-07-14 ENCOUNTER — APPOINTMENT (OUTPATIENT)
Dept: OTOLARYNGOLOGY | Facility: CLINIC | Age: 65
End: 2025-07-14
Payer: COMMERCIAL

## 2025-07-14 VITALS
WEIGHT: 135 LBS | TEMPERATURE: 96.5 F | SYSTOLIC BLOOD PRESSURE: 124 MMHG | DIASTOLIC BLOOD PRESSURE: 76 MMHG | BODY MASS INDEX: 27.58 KG/M2 | HEIGHT: 58.5 IN | HEART RATE: 94 BPM

## 2025-07-14 PROBLEM — Z83.3 FAMILY HISTORY OF DIABETES MELLITUS: Status: ACTIVE | Noted: 2025-07-14

## 2025-07-14 PROBLEM — Z82.49 FAMILY HISTORY OF HYPERTENSION: Status: ACTIVE | Noted: 2025-07-14

## 2025-07-14 PROBLEM — E78.00 HIGH CHOLESTEROL: Status: ACTIVE | Noted: 2025-07-14

## 2025-07-14 PROBLEM — Z82.49 FAMILY HISTORY OF CORONARY ARTERY DISEASE: Status: ACTIVE | Noted: 2025-07-14

## 2025-07-14 PROCEDURE — 99203 OFFICE O/P NEW LOW 30 MIN: CPT

## 2025-07-14 RX ORDER — LOSARTAN POTASSIUM 25 MG/1
25 TABLET, FILM COATED ORAL
Refills: 0 | Status: ACTIVE | COMMUNITY

## 2025-07-14 RX ORDER — ALBUTEROL 90 MCG
AEROSOL (GRAM) INHALATION
Refills: 0 | Status: ACTIVE | COMMUNITY

## 2025-07-14 RX ORDER — ALPRAZOLAM 1 MG/1
1 TABLET ORAL
Refills: 0 | Status: ACTIVE | COMMUNITY